# Patient Record
Sex: MALE | Race: WHITE
[De-identification: names, ages, dates, MRNs, and addresses within clinical notes are randomized per-mention and may not be internally consistent; named-entity substitution may affect disease eponyms.]

---

## 2018-10-23 ENCOUNTER — HOSPITAL ENCOUNTER (EMERGENCY)
Dept: HOSPITAL 44 - ED | Age: 53
Discharge: HOME | End: 2018-10-23
Payer: MEDICARE

## 2018-10-23 VITALS — SYSTOLIC BLOOD PRESSURE: 131 MMHG | DIASTOLIC BLOOD PRESSURE: 82 MMHG

## 2018-10-23 DIAGNOSIS — J40: Primary | ICD-10-CM

## 2018-10-23 PROCEDURE — 99283 EMERGENCY DEPT VISIT LOW MDM: CPT

## 2018-10-23 PROCEDURE — 96372 THER/PROPH/DIAG INJ SC/IM: CPT

## 2018-10-23 NOTE — ED PHYSICIAN DOCUMENTATION
General Adult





- HISTORIAN


Historian: patient





- HPI


Stated Complaint: Sore throat


Chief Complaint: Sore Throat


Additional Information: 





Intro self as NP. pt presents to the ED via POV alone. c/o cough, sore throat, 

malaise, x 3 weeks. pt reports that initially had the sore throat that improved 

then returned with productive cough 7 days ago, chills and fatigue.  





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 he

denies being on any type of anti rejection drugs or steroids.  





- ROS


CONST: recent illness, chills


EYES/ENT: none, sore throat, nasal drainage.  denies: nasal congestion


CVS/RESP: none


GI/: none


MS/SKIN/LYMPH: none


NEURO/PSYCH: denies: headache, fainting, dizziness





- PAST HX


Past History: other (pancreas transplant. DM1. )


Allergies/Adverse Reactions: 


                                    Allergies











Allergy/AdvReac Type Severity Reaction Status Date / Time


 


No Known Allergies Allergy   Verified 10/23/18 20:54














Home Medications: 


                                Ambulatory Orders











 Medication  Instructions  Recorded


 


Gabapentin [Neurontin] 1 cap PO TID 10/23/18


 


Insulin Aspart [Novolog] 1 units SQ DAILY 10/23/18


 


Insulin Detemir [Levemir Flex-Pen] 25 units SQ DAILY 10/23/18


 


Mycophenolate Sodium [Myfortic] 1 tab PO DAILY 10/23/18














- SOCIAL HX


Smoking History: non-smoker


Alcohol Use: occasionally


Drug Use: none





- FAMILY HX


Family History: No





- VITAL SIGNS


Vital Signs: 





                                   Vital Signs











Temp Pulse Resp BP Pulse Ox


 


 98.6 F   81   15   131/82   96 


 


 10/23/18 20:30  10/23/18 20:30  10/23/18 20:30  10/23/18 20:30  10/23/18 20:30














- REVIEWED ASSESSMENTS


Nursing Assessment  Reviewed: Yes


Vitals Reviewed: Yes





ED Results Lab/Radiology





- Orders


Orders: 





                                    ED Orders











 Category Date Time Status


 


 Lidocaine 1% 5ml(IM or SUTURE) [Xylocaine] Med  10/23/18 21:12 Once





 50 mg IJ NOW ONE   


 


 cefTRIAXone SODIUM [Rocephin] Med  10/23/18 22:00 Ordered





 1 gm IM QD   














General Adult Physical Exam





- PHYSICAL EXAM


GENERAL APPEARANCE: no distress


EENT: eye inspection normal, TM's nml, pharyngeal erythema


NECK: normal inspection, lymphadenopathy


RESPIRATORY: no resp distress, breath sounds normal.  No: wheezes, rales, 

rhonchi


CVS: reg rate & rhythm, no murmur, no gallop


ABDOMEN: soft, normal bowel sounds


BACK: normal inspection


SKIN: warm/dry


EXTREMITIES: non-tender, normal range of motion, no evidence of injury, no edema


NEURO: oriented X3, motor nml, sensation nml, mood/affect nml





Discharge


Clincal Impression: 


 Bronchitis





Referrals: 


Primary Doctor,No [Primary Care Provider] - 2 Days


Additional Instructions: 


Doxycycline 100 mg twice a day for 10 days. 





prednisone steroid taper pack- take as directed on package. 





Increase hydration





rest for 2-3 days





follow up with primary care in one week if not improved. 





Return or seek medical care if worse: chest pain, shortness of breath, feeling 

faint or passing out, or any concern. 


Condition: Good


Disposition: 01 HOME, SELF-CARE


Decision to Admit: NO


Date of Decison to Admit: 10/23/18


Decision Time: 21:20